# Patient Record
Sex: MALE | URBAN - NONMETROPOLITAN AREA
[De-identification: names, ages, dates, MRNs, and addresses within clinical notes are randomized per-mention and may not be internally consistent; named-entity substitution may affect disease eponyms.]

---

## 2020-06-08 ENCOUNTER — TELEPHONE (OUTPATIENT)
Dept: GASTROENTEROLOGY | Facility: CLINIC | Age: 60
End: 2020-06-08

## 2020-06-08 NOTE — TELEPHONE ENCOUNTER
I had sent pt a letter re: recall colon-it was returned. I called and spoke to pt about it-he has moved close to [place]. He tells me he isn't opposed to have a colon here as he is in [place] a lot but he would have to work out logistics. He wants to think about it and will call our office back to schedule OV if he wants to try to have it done here. If not, I advised he find a GI doc closer to where he lives and have it done there. Removed pt from my recall list.